# Patient Record
Sex: FEMALE | Race: WHITE | URBAN - METROPOLITAN AREA
[De-identification: names, ages, dates, MRNs, and addresses within clinical notes are randomized per-mention and may not be internally consistent; named-entity substitution may affect disease eponyms.]

---

## 2017-01-04 ENCOUNTER — OFFICE VISIT (OUTPATIENT)
Dept: GASTROENTEROLOGY | Facility: CLINIC | Age: 37
End: 2017-01-04
Payer: COMMERCIAL

## 2017-01-04 VITALS
RESPIRATION RATE: 16 BRPM | SYSTOLIC BLOOD PRESSURE: 101 MMHG | BODY MASS INDEX: 27.82 KG/M2 | HEART RATE: 97 BPM | DIASTOLIC BLOOD PRESSURE: 68 MMHG | HEIGHT: 63 IN | WEIGHT: 157 LBS

## 2017-01-04 DIAGNOSIS — A04.72 CLOSTRIDIUM DIFFICILE DIARRHEA: Primary | ICD-10-CM

## 2017-01-04 PROCEDURE — 99205 OFFICE O/P NEW HI 60 MIN: CPT | Performed by: INTERNAL MEDICINE

## 2017-01-04 RX ORDER — FERROUS SULFATE 325(65) MG
325 TABLET ORAL 2 TIMES DAILY
COMMUNITY
Start: 2014-07-09

## 2017-01-04 RX ORDER — AZATHIOPRINE 50 MG/1
100 TABLET ORAL DAILY
COMMUNITY
Start: 2016-03-22

## 2017-01-04 RX ORDER — VANCOMYCIN HYDROCHLORIDE 125 MG/1
125 CAPSULE ORAL DAILY
Qty: 5 CAPSULE | Refills: 0 | Status: SHIPPED | OUTPATIENT
Start: 2017-01-04 | End: 2017-01-09

## 2017-01-04 RX ORDER — ERGOCALCIFEROL 1.25 MG/1
1 CAPSULE, LIQUID FILLED ORAL DAILY
COMMUNITY
Start: 2016-11-03

## 2017-01-04 RX ORDER — FIDAXOMICIN 200 MG/1
200 TABLET, FILM COATED ORAL 2 TIMES DAILY
Refills: 0 | COMMUNITY
Start: 2016-12-28

## 2017-01-04 ASSESSMENT — PAIN SCALES - GENERAL: PAINLEVEL: NO PAIN (0)

## 2017-01-04 NOTE — MR AVS SNAPSHOT
After Visit Summary   1/4/2017    Richar Rodrigez    MRN: [id number]           Patient Information     Date Of Birth          1980        Visit Information        Provider Department      1/4/2017 10:00 AM Jose Velazquez MD [facility]        Today's Diagnoses     Clostridium difficile diarrhea    -  1       Care Instructions    I am writing you with the instructions for the Moviprep that you will be taking to prepare for Fecal Transplant by Colonoscopy.    This procedure will take place at:  [facility], [address] [address]  [address] on the 1st floor ([address])      You have been scheduled for: Monday January 16 th, 2017 3:00pm  Please check in by: 2:00pm    Continue to take your prescribed medicine through the Saturday prior to your procedure. Your last dose of Vancomycin should be taken at bedtime on Saturday January 14 th, 2017.     Start your Moviprep on: Vishal January 15 th, 2017    Your prescriptions for the colonoscopy prep was sent to [facility].    You are scheduled for your two month follow up on      Please follow the attached directions and call me with any questions.    Thank you,  BETH Green RN, BSN  [facility]  [phone]      FMT COLONOSCOPY  MOVIPREP/GOLYTELY INSTRUCTIONS     The inside of your intestine must be clean in order to allow examination of the colon and to proceed with the fecal transplant. The colonoscopy FMT prep will require drinking large amount of cleansing solution. Please review these instructions carefully well in advance. A number of tips are included in order to make this part of the procedure as comfortable as possible.     Please remember to arrange for a responsible  (not a taxi) to take you home on the day of the exam. You ll also need someone with you for at least 12 hours after the procedure.      ONE WEEK PRIOR  1.  Arrange for a responsible adult to drive you home on the day of the  exam. This cannot be a taxi as you will need someone with you after the procedure.    2. Be sure to tell the doctor who ordered the colonoscopy if you are on Coumadin or other blood thinners, such as Plavix (clopidogrel), Efflient (prasugrel), Xarelto (rivaroxaban), etc. These medication need to be discontinued. Aspirin can generally be continued. However, you may need special instructions. If you are on Coumadin, you will need to have your INR checked the day before the procedure in your doctor s office.    3. Discontinue iron supplements and multivitamins.    4. Discontinue fiber supplements.    5.  your prescription for the MoviPrep or Golytely at the pharmacy.      If you get the Moviprep you will also get 2 Simethicone tabs      If you get the Golytely prep you get 3 tabs of bisacodyl (Ducolax)    6. The colonoscopy prep solution will lead to watery diarrhea, which can often cause irritation and burning in the shasha-anal area. When you re picking up the prep prescription at the pharmacy, we are recommending that you consider getting the following products, which are available over the counter:    One of these creams or ointments: Vaseline, Aquaphor, or diaper rash cream/zinc oxide.    Tucks or Fleet relief pads.      2 DAYS PRIOR    1. It is very important that you stay well hydrated during the colonoscopy prep. The large volume of the bowel-cleansing liquid is designed to clean your colon, but it will not provide hydration. We recommend getting 64 oz of Gatorade or a similar sports drink product (avoid red and purple) that you can drink while on your clear liquid diet.      1 DAY PRIOR - TAKING THE MOVI PREP or GOLYTELY    1. Please begin a clear liquid diet with breakfast. (Clear liquid diet items are listed below)    2. Mix MoviPrep solution (or GoLytely) as directed on the container. You may put it in the refrigerator to chill before  drinking.       Most people agree that bowel preps taste better if chilled. You might wish to chill the glass as well. However, large amounts of very cold liquids can cause discomfort and nausea.    2. If you received the Golytely take the 3 Bisacodyl (Ducolax) tabs   at 1 pm.    3.  You may remove the MoviPrep/Golytely solution from the refrigerator about 15-30 minutes before drinking. You will start drinking the first liter (4 glasses) of the MoviPrep prep starting at 4-6 PM. If you are drinking GoLytely Prep instead, the volume is double -- 8 glasses.     The following are some of the tips that will help you tolerate it better:      Unflavored preps are generally better received. You might squeeze some lemon juice into it.      Drink the prep quickly. Do not sip in small amounts. A good pace is one 8 oz glass every 10-15 minutes.      You may find that drinking through a straw is better tolerated because it avoids contact of the salty liquid with your taste buds.      Rinse your mouth with water, clear soda, or mouthwash after drinking.      If you wish, you can suck on hard candy or lollipops (no red or purple).    3. You will likely start having frequent liquid bowel movements within an hour of drinking MoviPrep/Golytely. The liquid diarrhea can irritate the shasha-anal area.    The following are some of the tips to avoid a sore bottom:      Avoid rubbing. Instead, pad gently with a wet washcloth or pre-moistened wipe, or rinse with water.      Apply protective cream or ointment such as Vaseline, Aquaphor, or zinc-oxide after each trip to the bathroom.      Refrigerated Tucks or Fleet relief pads are excellent for wiping.      5. The prep will not keep you fully hydrated. You should continue drinking large amounts of clear liquids such as Gatorade through out this day.    6. If you have diabetes and take evening insulin, take one half of your usual evening dose of NPH, Lente or Novolin 70/30 or Lantus insulin. DO  NOT TAKE Regular or Humalog insulin the evening prior to your test. If you take Ultralente insulin or are taking three or more injections per day, please contact the health care provider who manages your diabetes for specific instructions.    CLEAR LIQUID DIET    Sports drinks such as Gatorade, All-Sport,  Powerade, etc.    Coffee, tea, water    Pulp-free juices    Lemonade from powdered mixes    Fruit-flavored drinks, such as Demetrio-Aid,  Crystal Light, etc.    Carbonated beverages and soda    Fat-free broth, bouillion    Plain or flavored gelatins    Fruit Ices, Italian ices    Sorbet    Popsicles without milk or added fruit pieces    Clear liquid nutritional supplements, e.g.,  Enlive, Resource Breeze, Mertens Instant  Breakfast Juice Drink (not regular Mertens  Instant Breakfast)    Honey    Sugar    Hard candy    AVOID:  ALCOHOLIC BEVERAGES  RED OR PURPLE COLORED ITEMS - please make  sure that is true for all  suggested items above, including the  clear liquid diet.    THE DAY OF THE COLONOSCOPY PROCEDURE    PROCEDURE DAY    1. You may take your morning medications with   glass of water.    2. If you received the Moviprep take the 2 Simethicone tabs around 6am.     3. Drink the second liter (4 glasses) of the MoviPrep or 8 glasses of Golytely. The split dosage is important because it results in optimal clearing of the colon.     4. Stop drinking liquids 6 hours before the test.     **If you are diabetic please see instructions for diabetes medications below:  If you have diabetes and take oral diabetes medications, do not take it the morning of your test.  If you have diabetes and take insulin, take one half of your usual morning dose of NPH, Lente or  Novolin 70/30 or Lantus insulin. DO NOT TAKE Regular or Humalog insulin the morning of your test.  If you take Ultralente insulin or are taking three or more injections per day, please contact the health care  provider who manages your diabetes for specific  instructions.    **If you have asthma, please bring your inhalers with you to the hospital    Make sure you have a  to take you home after the procedure. You will get sedative medications and you will be unable to drive until the day after the test. A taxi cannot replace a responsible adult who must be with you. You will ALSO need someone to be with you after the procedure for a period of 12 hours. There can be delayed reactions of the sedative medications that warrant this precaution.     Finally, no alcohol should be consumed on the day of procedure because it can potentiate effects of the sedative medications.            Follow-ups after your visit        Your next 10 appointments already scheduled     Jan 16, 2017   Procedure with Jose Velazquez MD   [facility], [place], Endoscopy ([facility], [facility])    [address] [address] [phone]           The [facility] is located on the corner of [address] and [address] on the [facility]. It is easily accessible from virtually any point in the [facility] area, via [employer] and [employer].              Future tests that were ordered for you today     Open Future Orders        Priority Expected Expires Ordered    COLONOSCOPY Routine 2/3/2017 7/3/2017 1/4/2017            Who to contact     If you have questions or need follow up information about today's clinic visit or your schedule please contact [facility] directly at [phone].  Normal or non-critical lab and imaging results will be communicated to you by MyChart, letter or phone within 4 business days after the clinic has received the results. If you do not hear from us within 7 days, please contact the clinic through MyChart or phone. If you have a critical or abnormal lab result, we will notify you by phone as soon as possible.  Submit refill requests through [employer] or call your pharmacy and  "they will forward the refill request to us. Please allow 3 business days for your refill to be completed.          Additional Information About Your Visit        [employer] Information     [address] is an electronic gateway that provides easy, online access to your medical records. With [address], you can request a clinic appointment, read your test results, renew a prescription or communicate with your care team.     To sign up for [address] visit the website at [email or web address]   You will be asked to enter the access code listed below, as well as some personal information. Please follow the directions to create your username and password.     Your access code is: [email or web address]  Expires: 2017 11:00 AM     Your access code will  in 90 days. If you need help or a new code, please contact your [facility] or call [phone] for assistance.        Care EveryWhere ID     This is your Care EveryWhere ID. This could be used by other organizations to access your [place] medical records  [id number]        Your Vitals Were     Pulse Respirations Height BMI (Body Mass Index)          97 16 1.6 m (5' 3\") 27.82 kg/m2         Blood Pressure from Last 3 Encounters:   17 101/68    Weight from Last 3 Encounters:   17 71.215 kg (157 lb)               Primary Care Provider    None Specified       No primary provider on file.        Thank you!     Thank you for choosing [facility]  for your care. Our goal is always to provide you with excellent care. Hearing back from our patients is one way we can continue to improve our services. Please take a few minutes to complete the written survey that you may receive in the mail after your visit with us. Thank you!             Your Updated Medication List - Protect others around you: Learn how to safely use, store and throw away your medicines at www.disposemymeds.org.          This list is accurate as of: 17 11:00 " AM.  Always use your most recent med list.                   Brand Name Dispense Instructions for use    amitriptyline 25 MG tablet    ELAVIL     Take 1 tablet by mouth daily       azaTHIOprine 50 MG tablet    IMURAN     Take 100 mg by mouth daily       DIFICID 200 MG tablet   Generic drug:  fidaxomicin      Take 200 mg by mouth 2 times daily       FERROUSUL 325 (65 FE) MG tablet   Generic drug:  ferrous sulfate      Take 325 mg by mouth 2 times daily       vitamin D 44542 UNIT capsule    ERGOCALCIFEROL     Take 1 capsule by mouth daily

## 2017-01-04 NOTE — PROGRESS NOTES
HISTORY OF PRESENT ILLNESS:  The patient was referred by Torie Marx.  The patient has multiply recurrent C. difficile infection and underlying ileocolonic Crohn's disease.  She was diagnosed with a C. difficile problem in September 2016 and this was treated with vancomycin at first which improved her diarrhea after several days and then she had a recurrence and she was treated with vancomycin taper, after subsequent recurrence she was treated with another course of vancomycin and then after another relapse was placed on Dificid which she is on at this time.  The patient currently says that she is not tolerating the Dificid as well as the vancomycin and she has had some problems with headaches, nausea, lightheadedness and some flank pain.  Nevertheless, her bowel movement frequency is improved compared to the original onset of the diarrheal symptoms in September, she has about 5 bowel movements per day.  They are quite loose type 6 to type 7 on the Potter Stool Scale.      Her underlying Crohn's disease history is fairly complex.  She has had a partial ileal resection, she thinks about 20 cm, which has been complicated by anastomotic strictures.  She had a dilatation about a year ago; there was a thought of evaluating small bowel disease with capsule endoscopy in November, however, she failed the Agile capsule.  The patient was originally diagnosed with Crohn's disease in 2010, at which time the disease was documented in the ileum and colon.  At first she was treated with corticosteroids and azathioprine; in fact, at that time she also had C. difficile infection as well as Candida esophagitis.  She had been started on amitriptyline for abdominal pain and had remained on that to this time.  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  In 2011, she had an appendectomy and was briefly on Remicade.  Apparently that was not sustained and later she  had been on Humira and had some good periods.  She had discontinued Humira for her pregnancy and when she restarted had some side effects, but she does not remember what they were, one of them was hair loss but there were others and she cannot recall.  She had a loop ileostomy in 2011 and 2012, but currently everything is reconnected, although she has had problems with anastomotic strictures as noted.  She also has had problems with an anal fissure over the last month.     CURRENT MEDICATIONS:  Azathioprine 50 b.i.d. and amitriptyline, in the course of the recurrent C. difficile infection since September she had been on prednisone, but she is not on it now, her told inflammatory markers were actually normal.  She also gets iron infusions for chronic iron deficiency.      SOCIAL HISTORY:  The patient lives in [place], works in [employer] industry.  She has 3 children, a 2-year-old girl and 2 7-year-old identical twin boys.      PHYSICAL EXAMINATION:   GENERAL:  She is in no acute distress.   VITAL SIGNS:  Stable.      SUMMARY OF CLINICAL COURSE:  Patient with history of ileocolonic Crohn's disease complicated recently by C. difficile infection with exacerbation of diarrhea.  There was no antibiotic trigger for C. difficile infection, although she has had a stuttering course of C. diff infections in the past including diagnosis in 2010 and a single course in April of 2016.      ASSESSMENT AND PLAN:  The concurrence of Clostridium difficile infection and inflammatory bowel disease is especially challenging.  Sometimes we actually do not know whether the patient is merely a carrier of Clostridium difficile or C. difficile is contributing to the disease course.  In this case, it seems C. difficile diagnosis was associated with exacerbation of diarrhea but formally it is still possible that the positive test is merely a marker of disease severity.  She is a candidate for fecal microbiota transplant given the course of  recurrences and failure of antibiotics alone to break the cycle of Clostridium difficile infections.  However, we do need to appreciate the possibility of varying outcomes including 1) somewhat lower success rate in clearing C. difficile in patients with underlying IBD in the first place, 2) possibility of a Crohn's flare in response to new microbiota and 3) progression of underlying Crohn's disease regardless of whether C. difficile has cleared not.  Our experience with C. difficile infections in IBD patients currently is approximately 60 patients and we are getting a much better feel for the complexity of the concurrence of IBD and C. Difficile infection scenario.  She will require close followup after the FMT to monitor for both Crohn's activity and possibility of late C. difficile reoccurrence.  With respect to the ongoing nausea symptoms the primary concern that I have is that she still has an anastomotic stricture.  If it looks fairly simple will plan on dilating that at the time of the FMT which will be done with a colonoscopy and otherwise she may require repeat procedure under fluoroscopy and possible stent placement.  The colonoscopic examination at the time of the FMT will also be helpful in guiding her IBD therapy.  In general, it appears that the optimal results are obtained when both C. difficile and underlying inflammatory bowel disease are treated aggressively simultaneously.      TOTAL TIME SPENT:  Total time spent in face-to-face consultation was 1 hour, over 50% was spent counseling and coordinating care.         SAIMA DIEGO MD             D: 2017 11:14   T: 2017 13:23   MT: [id number]      Name:     VIDHYA MONTANO   MRN:      [id number]        Account:      [id number]   :      1980           Visit Date:   2017      Document: [id number]

## 2017-01-04 NOTE — PATIENT INSTRUCTIONS
I am writing you with the instructions for the Moviprep that you will be taking to prepare for Fecal Transplant by Colonoscopy.    This procedure will take place at:  [facility], [address] [address]  [address] on the 1st floor ([address])      You have been scheduled for: Monday January 16 th, 2017 3:00pm  Please check in by: 2:00pm    Continue to take your prescribed medicine through the Saturday prior to your procedure. Your last dose of Vancomycin should be taken at bedtime on Saturday January 14 th, 2017.     Start your Moviprep on: Vishal January 15 th, 2017    Your prescriptions for the colonoscopy prep was sent to [facility].    You are scheduled for your two month follow up on      Please follow the attached directions and call me with any questions.    Thank you,  BETH Green RN, BSN  [facility]  [phone]      FMT COLONOSCOPY  MOVIPREP/GOLYTELY INSTRUCTIONS     The inside of your intestine must be clean in order to allow examination of the colon and to proceed with the fecal transplant. The colonoscopy FMT prep will require drinking large amount of cleansing solution. Please review these instructions carefully well in advance. A number of tips are included in order to make this part of the procedure as comfortable as possible.     Please remember to arrange for a responsible  (not a taxi) to take you home on the day of the exam. You ll also need someone with you for at least 12 hours after the procedure.      ONE WEEK PRIOR  1. Arrange for a responsible adult to drive you home on the day of the  exam. This cannot be a taxi as you will need someone with you after the procedure.    2. Be sure to tell the doctor who ordered the colonoscopy if you are on Coumadin or other blood thinners, such as Plavix (clopidogrel), Efflient (prasugrel), Xarelto (rivaroxaban), etc. These medication need to be  discontinued. Aspirin can generally be continued. However, you may need special instructions. If you are on Coumadin, you will need to have your INR checked the day before the procedure in your doctor s office.    3. Discontinue iron supplements and multivitamins.    4. Discontinue fiber supplements.    5.  your prescription for the MoviPrep or Golytely at the pharmacy.      If you get the Moviprep you will also get 2 Simethicone tabs      If you get the Golytely prep you get 3 tabs of bisacodyl (Ducolax)    6. The colonoscopy prep solution will lead to watery diarrhea, which can often cause irritation and burning in the shasha-anal area. When you re picking up the prep prescription at the pharmacy, we are recommending that you consider getting the following products, which are available over the counter:    One of these creams or ointments: Vaseline, Aquaphor, or diaper rash cream/zinc oxide.    Tucks or Fleet relief pads.      2 DAYS PRIOR    1. It is very important that you stay well hydrated during the colonoscopy prep. The large volume of the bowel-cleansing liquid is designed to clean your colon, but it will not provide hydration. We recommend getting 64 oz of Gatorade or a similar sports drink product (avoid red and purple) that you can drink while on your clear liquid diet.      1 DAY PRIOR - TAKING THE MOVI PREP or GOLYTELY    1. Please begin a clear liquid diet with breakfast. (Clear liquid diet items are listed below)    2. Mix MoviPrep solution (or GoLytely) as directed on the container. You may put it in the refrigerator to chill before drinking.       Most people agree that bowel preps taste better if chilled. You might wish to chill the glass as well. However, large amounts of very cold liquids can cause discomfort and nausea.    2. If you received the Golytely take the 3 Bisacodyl (Ducolax) tabs   at 1 pm.    3.  You may remove the MoviPrep/Golytely solution from the refrigerator about 15-30  minutes before drinking. You will start drinking the first liter (4 glasses) of the MoviPrep prep starting at 4-6 PM. If you are drinking GoLytely Prep instead, the volume is double -- 8 glasses.     The following are some of the tips that will help you tolerate it better:      Unflavored preps are generally better received. You might squeeze some lemon juice into it.      Drink the prep quickly. Do not sip in small amounts. A good pace is one 8 oz glass every 10-15 minutes.      You may find that drinking through a straw is better tolerated because it avoids contact of the salty liquid with your taste buds.      Rinse your mouth with water, clear soda, or mouthwash after drinking.      If you wish, you can suck on hard candy or lollipops (no red or purple).    3. You will likely start having frequent liquid bowel movements within an hour of drinking MoviPrep/Golytely. The liquid diarrhea can irritate the shasha-anal area.    The following are some of the tips to avoid a sore bottom:      Avoid rubbing. Instead, pad gently with a wet washcloth or pre-moistened wipe, or rinse with water.      Apply protective cream or ointment such as Vaseline, Aquaphor, or zinc-oxide after each trip to the bathroom.      Refrigerated Tucks or Fleet relief pads are excellent for wiping.      5. The prep will not keep you fully hydrated. You should continue drinking large amounts of clear liquids such as Gatorade through out this day.    6. If you have diabetes and take evening insulin, take one half of your usual evening dose of NPH, Lente or Novolin 70/30 or Lantus insulin. DO NOT TAKE Regular or Humalog insulin the evening prior to your test. If you take Ultralente insulin or are taking three or more injections per day, please contact the health care provider who manages your diabetes for specific instructions.    CLEAR LIQUID DIET    Sports drinks such as Gatorade, All-Sport,  Powerade, etc.    Coffee, tea, water    Pulp-free  juices    Lemonade from powdered mixes    Fruit-flavored drinks, such as Demetrio-Aid,  Crystal Light, etc.    Carbonated beverages and soda    Fat-free broth, bouillion    Plain or flavored gelatins    Fruit Ices, Italian ices    Sorbet    Popsicles without milk or added fruit pieces    Clear liquid nutritional supplements, e.g.,  Enlive, Resource Breeze, [place] Instant  Breakfast Juice Drink (not regular [place]  Instant Breakfast)    Honey    Sugar    Hard candy    AVOID:  ALCOHOLIC BEVERAGES  RED OR PURPLE COLORED ITEMS - please make  sure that is true for all  suggested items above, including the  clear liquid diet.    THE DAY OF THE COLONOSCOPY PROCEDURE    PROCEDURE DAY    1. You may take your morning medications with   glass of water.    2. If you received the Moviprep take the 2 Simethicone tabs around 6am.     3. Drink the second liter (4 glasses) of the MoviPrep or 8 glasses of Golytely. The split dosage is important because it results in optimal clearing of the colon.     4. Stop drinking liquids 6 hours before the test.     **If you are diabetic please see instructions for diabetes medications below:  If you have diabetes and take oral diabetes medications, do not take it the morning of your test.  If you have diabetes and take insulin, take one half of your usual morning dose of NPH, Lente or  Novolin 70/30 or Lantus insulin. DO NOT TAKE Regular or Humalog insulin the morning of your test.  If you take Ultralente insulin or are taking three or more injections per day, please contact the health care  provider who manages your diabetes for specific instructions.    **If you have asthma, please bring your inhalers with you to the hospital    Make sure you have a  to take you home after the procedure. You will get sedative medications and you will be unable to drive until the day after the test. A taxi cannot replace a responsible adult who must be with you. You will ALSO need someone to be with you  after the procedure for a period of 12 hours. There can be delayed reactions of the sedative medications that warrant this precaution.     Finally, no alcohol should be consumed on the day of procedure because it can potentiate effects of the sedative medications.

## 2017-01-16 ENCOUNTER — HOSPITAL ENCOUNTER (OUTPATIENT)
Facility: CLINIC | Age: 37
Discharge: HOME | End: 2017-01-16
Attending: INTERNAL MEDICINE | Admitting: INTERNAL MEDICINE
Payer: COMMERCIAL

## 2017-01-16 VITALS
DIASTOLIC BLOOD PRESSURE: 74 MMHG | OXYGEN SATURATION: 100 % | SYSTOLIC BLOOD PRESSURE: 96 MMHG | HEART RATE: 87 BPM | RESPIRATION RATE: 20 BRPM

## 2017-01-16 LAB — COLONOSCOPY: NORMAL

## 2017-01-16 PROCEDURE — 25000125 ZZHC RX 250: Performed by: INTERNAL MEDICINE

## 2017-01-16 PROCEDURE — 45378 DIAGNOSTIC COLONOSCOPY: CPT | Performed by: INTERNAL MEDICINE

## 2017-01-16 PROCEDURE — G0500 MOD SEDAT ENDO SERVICE >5YRS: HCPCS | Performed by: INTERNAL MEDICINE

## 2017-01-16 PROCEDURE — 40000141 ZZH STATISTIC PERIPHERAL IV START W/O US GUIDANCE

## 2017-01-16 RX ORDER — FENTANYL CITRATE 50 UG/ML
INJECTION, SOLUTION INTRAMUSCULAR; INTRAVENOUS PRN
Status: DISCONTINUED | OUTPATIENT
Start: 2017-01-16 | End: 2017-01-16 | Stop reason: HOSPADM

## 2017-01-16 RX ORDER — LIDOCAINE 40 MG/G
CREAM TOPICAL
Status: DISCONTINUED | OUTPATIENT
Start: 2017-01-16 | End: 2017-01-16 | Stop reason: HOSPADM

## 2017-01-16 RX ORDER — ONDANSETRON 2 MG/ML
4 INJECTION INTRAMUSCULAR; INTRAVENOUS
Status: DISCONTINUED | OUTPATIENT
Start: 2017-01-16 | End: 2017-01-16 | Stop reason: HOSPADM

## 2017-01-16 NOTE — OR NURSING
Pt tolerated colonoscopy with FMT very well. Total sedatopn annie was 13 minutes. 2 L O2 started soon after medication was started .

## (undated) RX ORDER — FENTANYL CITRATE 50 UG/ML
INJECTION, SOLUTION INTRAMUSCULAR; INTRAVENOUS
Status: DISPENSED
Start: 2017-01-16